# Patient Record
Sex: FEMALE | Race: BLACK OR AFRICAN AMERICAN | Employment: FULL TIME | URBAN - METROPOLITAN AREA
[De-identification: names, ages, dates, MRNs, and addresses within clinical notes are randomized per-mention and may not be internally consistent; named-entity substitution may affect disease eponyms.]

---

## 2017-05-11 ENCOUNTER — HOSPITAL ENCOUNTER (OUTPATIENT)
Dept: LAB | Age: 57
Discharge: HOME | End: 2017-05-11
Payer: COMMERCIAL

## 2017-05-11 LAB
25(OH)D3 SERPL-MCNC: 26.8 NG/ML (ref 30–100)
TSH SERPL DL<=0.05 MIU/L-ACNC: 9.63 UIU/ML (ref 0.36–3.74)

## 2017-05-11 PROCEDURE — 36415 COLL VENOUS BLD VENIPUNCTURE: CPT | Performed by: FAMILY MEDICINE

## 2017-05-11 PROCEDURE — 84439 ASSAY OF FREE THYROXINE: CPT | Performed by: FAMILY MEDICINE

## 2017-05-11 PROCEDURE — 82306 VITAMIN D 25 HYDROXY: CPT | Performed by: FAMILY MEDICINE

## 2017-05-11 PROCEDURE — 84443 ASSAY THYROID STIM HORMONE: CPT | Performed by: FAMILY MEDICINE

## 2017-05-12 LAB
FAX TO INFO,FAXT: NORMAL
FAX TO NUMBER,FAXN: NORMAL
T4 FREE SERPL-MCNC: 1.6 NG/DL (ref 0.82–1.77)

## 2017-06-01 ENCOUNTER — HOSPITAL ENCOUNTER (OUTPATIENT)
Dept: NON INVASIVE DIAGNOSTICS | Age: 57
Discharge: HOME | End: 2017-06-01
Attending: FAMILY MEDICINE
Payer: COMMERCIAL

## 2017-06-01 DIAGNOSIS — R01.1 HEART MURMUR: ICD-10-CM

## 2017-06-01 PROCEDURE — 93306 TTE W/DOPPLER COMPLETE: CPT

## 2017-06-26 ENCOUNTER — HOSPITAL ENCOUNTER (OUTPATIENT)
Dept: LAB | Age: 57
Discharge: HOME | End: 2017-06-26
Payer: COMMERCIAL

## 2017-06-26 LAB
25(OH)D3 SERPL-MCNC: 59.4 NG/ML (ref 30–100)
ALBUMIN SERPL BCP-MCNC: 3.8 G/DL (ref 3.4–5)
ALBUMIN/GLOB SERPL: 1.2 {RATIO} (ref 0.8–1.7)
ALP SERPL-CCNC: 45 U/L (ref 45–117)
ALT SERPL-CCNC: 28 U/L (ref 13–56)
ANION GAP BLD CALC-SCNC: 8 MMOL/L (ref 3–18)
AST SERPL W P-5'-P-CCNC: 30 U/L (ref 15–37)
BASOPHILS # BLD AUTO: 0.1 K/UL (ref 0–0.06)
BASOPHILS # BLD: 1 % (ref 0–2)
BILIRUB SERPL-MCNC: 0.4 MG/DL (ref 0.2–1)
BUN SERPL-MCNC: 13 MG/DL (ref 7–18)
BUN/CREAT SERPL: 14 (ref 12–20)
CALCIUM SERPL-MCNC: 8.9 MG/DL (ref 8.5–10.1)
CHLORIDE SERPL-SCNC: 105 MMOL/L (ref 100–108)
CHOLEST SERPL-MCNC: 314 MG/DL
CO2 SERPL-SCNC: 31 MMOL/L (ref 21–32)
CREAT SERPL-MCNC: 0.94 MG/DL (ref 0.6–1.3)
DIFFERENTIAL METHOD BLD: ABNORMAL
EOSINOPHIL # BLD: 0.2 K/UL (ref 0–0.4)
EOSINOPHIL NFR BLD: 3 % (ref 0–5)
ERYTHROCYTE [DISTWIDTH] IN BLOOD BY AUTOMATED COUNT: 11.9 % (ref 11.6–14.5)
EST. AVERAGE GLUCOSE BLD GHB EST-MCNC: NORMAL MG/DL
FAX TO INFO,FAXT: NORMAL
FAX TO NUMBER,FAXN: NORMAL
GLOBULIN SER CALC-MCNC: 3.3 G/DL (ref 2–4)
GLUCOSE SERPL-MCNC: 93 MG/DL (ref 74–99)
HBA1C MFR BLD: 4.9 % (ref 4.5–5.6)
HCT VFR BLD AUTO: 38.1 % (ref 35–45)
HDLC SERPL-MCNC: 60 MG/DL (ref 40–60)
HDLC SERPL: 5.2 {RATIO} (ref 0–5)
HGB BLD-MCNC: 12.3 G/DL (ref 12–16)
LDLC SERPL CALC-MCNC: 203.8 MG/DL (ref 0–100)
LIPID PROFILE,FLP: ABNORMAL
LYMPHOCYTES # BLD AUTO: 46 % (ref 21–52)
LYMPHOCYTES # BLD: 2.7 K/UL (ref 0.9–3.6)
MCH RBC QN AUTO: 29 PG (ref 24–34)
MCHC RBC AUTO-ENTMCNC: 32.3 G/DL (ref 31–37)
MCV RBC AUTO: 89.9 FL (ref 74–97)
MONOCYTES # BLD: 0.4 K/UL (ref 0.05–1.2)
MONOCYTES NFR BLD AUTO: 6 % (ref 3–10)
NEUTS SEG # BLD: 2.6 K/UL (ref 1.8–8)
NEUTS SEG NFR BLD AUTO: 44 % (ref 40–73)
PLATELET # BLD AUTO: 234 K/UL (ref 135–420)
PMV BLD AUTO: 10.4 FL (ref 9.2–11.8)
POTASSIUM SERPL-SCNC: 4.4 MMOL/L (ref 3.5–5.5)
PROT SERPL-MCNC: 7.1 G/DL (ref 6.4–8.2)
RBC # BLD AUTO: 4.24 M/UL (ref 4.2–5.3)
SODIUM SERPL-SCNC: 144 MMOL/L (ref 136–145)
TRIGL SERPL-MCNC: 251 MG/DL (ref ?–150)
TSH SERPL DL<=0.05 MIU/L-ACNC: 17.9 UIU/ML (ref 0.36–3.74)
VLDLC SERPL CALC-MCNC: 50.2 MG/DL
WBC # BLD AUTO: 5.9 K/UL (ref 4.6–13.2)

## 2017-06-26 PROCEDURE — 80061 LIPID PANEL: CPT | Performed by: FAMILY MEDICINE

## 2017-06-26 PROCEDURE — 36415 COLL VENOUS BLD VENIPUNCTURE: CPT | Performed by: FAMILY MEDICINE

## 2017-06-26 PROCEDURE — 80053 COMPREHEN METABOLIC PANEL: CPT | Performed by: FAMILY MEDICINE

## 2017-06-26 PROCEDURE — 84443 ASSAY THYROID STIM HORMONE: CPT | Performed by: FAMILY MEDICINE

## 2017-06-26 PROCEDURE — 83036 HEMOGLOBIN GLYCOSYLATED A1C: CPT | Performed by: FAMILY MEDICINE

## 2017-06-26 PROCEDURE — 82306 VITAMIN D 25 HYDROXY: CPT | Performed by: FAMILY MEDICINE

## 2017-06-26 PROCEDURE — 85025 COMPLETE CBC W/AUTO DIFF WBC: CPT | Performed by: FAMILY MEDICINE

## 2017-09-06 ENCOUNTER — OFFICE VISIT (OUTPATIENT)
Dept: CARDIOLOGY CLINIC | Age: 57
End: 2017-09-06

## 2017-09-06 VITALS
HEART RATE: 78 BPM | BODY MASS INDEX: 25.43 KG/M2 | OXYGEN SATURATION: 97 % | SYSTOLIC BLOOD PRESSURE: 110 MMHG | WEIGHT: 162 LBS | HEIGHT: 67 IN | DIASTOLIC BLOOD PRESSURE: 60 MMHG

## 2017-09-06 DIAGNOSIS — I49.3 PVC (PREMATURE VENTRICULAR CONTRACTION): ICD-10-CM

## 2017-09-06 DIAGNOSIS — E78.00 HYPERCHOLESTEREMIA: ICD-10-CM

## 2017-09-06 DIAGNOSIS — E03.9 HYPOTHYROIDISM, UNSPECIFIED TYPE: ICD-10-CM

## 2017-09-06 DIAGNOSIS — I10 ESSENTIAL HYPERTENSION: ICD-10-CM

## 2017-09-06 DIAGNOSIS — R00.2 PALPITATIONS: Primary | ICD-10-CM

## 2017-09-06 RX ORDER — LEVOTHYROXINE SODIUM 200 UG/1
TABLET ORAL
Refills: 1 | COMMUNITY
Start: 2017-06-28

## 2017-09-06 RX ORDER — LISINOPRIL AND HYDROCHLOROTHIAZIDE 20; 25 MG/1; MG/1
TABLET ORAL DAILY
COMMUNITY

## 2017-09-06 NOTE — PROGRESS NOTES
1. Have you been to the ER, urgent care clinic since your last visit? Hospitalized since your last visit? no  2. Have you seen or consulted any other health care providers outside of the [address] [place] [address] since your last visit? Include any pap smears or colon screening.  no

## 2017-09-06 NOTE — PROGRESS NOTES
HISTORY OF PRESENT ILLNESS  Carrillo Garcia is a 61 y.o. female. HPI    Patient presents for annual office visit. She has a past medical history significant for hypertension, dyslipidemia, and hypothyroidism. She was referred here for evaluation of palpitations which have been continuous over the past week or 2. The patient states it feels like her heart is skipping beats or beating irregularly. She denies that her heart is racing. No associated dizziness, diaphoresis, syncope or near syncope. She reports that her thyroid medication was recently adjusted 3-4 weeks ago. She feels the palpitations have been worse since that time. She did undergo an echocardiogram in June 2017 which showed preserved LV systolic function, EF 20-35%, mild left atrial enlargement, grade 1 diastolic dysfunction mild to moderate tricuspid regurgitation. She denies any chest pain or pressure. No significant change in her activity tolerance. No shortness of breath at rest with exertion, orthopnea, PND, leg swelling or claudication. Past Medical History:   Diagnosis Date    Ectopic pregnancy     Hypercholesteremia     Hypertension     Hypothyroid     Menopause     2008 Age 49     Current Outpatient Prescriptions   Medication Sig Dispense Refill    levothyroxine (SYNTHROID) 200 mcg tablet TAKE 1 TABLET BY ORAL ROUTE EVERY DAY  1    lisinopril-hydroCHLOROthiazide (PRINZIDE, ZESTORETIC) 20-25 mg per tablet Take  by mouth daily.  colesevelam (WELCHOL) 625 mg tablet Take 1,875 mg by mouth two (2) times daily (with meals).  omeprazole (PRILOSEC) 40 mg capsule Take 40 mg by mouth daily.  Cholecalciferol, Vitamin D3, 3,000 unit tab Take  by mouth.  Omega-3 Fatty Acids 300 mg cap Take  by mouth.  KRILL-OM3-DHA-EPA-OM6-LIP-ASTX PO Take  by mouth.        No Known Allergies     Social History   Substance Use Topics    Smoking status: Former Smoker     Packs/day: 1.00     Years: 15.00     Quit date: 9/6/2005    Smokeless tobacco: Never Used    Alcohol use Yes      Comment: occassionally     Family History   Problem Relation Age of Onset    Diabetes Mother     Diabetes Father          Review of Systems   Constitutional: Negative for chills, fever and weight loss. HENT: Negative for nosebleeds. Eyes: Negative for blurred vision and double vision. Respiratory: Negative for cough, shortness of breath and wheezing. Cardiovascular: Positive for palpitations. Negative for chest pain, orthopnea, claudication, leg swelling and PND. Gastrointestinal: Negative for abdominal pain, heartburn, nausea and vomiting. Genitourinary: Negative for dysuria and hematuria. Musculoskeletal: Negative for falls and myalgias. Skin: Negative for rash. Neurological: Negative for dizziness, focal weakness and headaches. Endo/Heme/Allergies: Does not bruise/bleed easily. Psychiatric/Behavioral: Negative for substance abuse. Visit Vitals    /60    Pulse 78    Ht 5' 7\" (1.702 m)    Wt 73.5 kg (162 lb)    SpO2 97%    BMI 25.37 kg/m2       Physical Exam   Constitutional: She is oriented to person, place, and time. She appears well-developed and well-nourished. HENT:   Head: Normocephalic and atraumatic. Eyes: Conjunctivae are normal.   Neck: Neck supple. No JVD present. Carotid bruit is not present. Cardiovascular: Normal rate, regular rhythm, S1 normal, S2 normal and normal pulses. Exam reveals no gallop. No murmur heard. Pulmonary/Chest: Effort normal and breath sounds normal. She has no wheezes. She has no rales. Abdominal: Soft. Bowel sounds are normal. There is no tenderness. Musculoskeletal: She exhibits no edema, tenderness or deformity. Neurological: She is alert and oriented to person, place, and time. Skin: Skin is warm and dry. Psychiatric: She has a normal mood and affect.  Her behavior is normal. Thought content normal.     EKG: Normal sinus rhythm, normal axis, normal QTc interval, no ST or T-wave abnormalities concerning for ischemia, occasional isolated PVC, otherwise normal tracing. ASSESSMENT and PLAN  Encounter Diagnoses   Name Primary?  Palpitations Yes    PVC (premature ventricular contraction)     Essential hypertension     Hypercholesteremia     Hypothyroidism, unspecified type      Palpitations. Patient may be experiencing symptomatic PVCs. She does have one PVC present on her 12-lead EKG today in the office. I recommended a 48 hour Holter monitor since her symptoms are occurring on a daily basis. She had a recent echocardiogram done in June 2017 which was unremarkable. Essential hypertension. Patient blood pressure appears well-controlled on her current regimen of lisinopril/HCTZ. Dyslipidemia. Patient has been treated with both fish oil and WelChol. This is followed by her PCP. Hypothyroidism. Patient reports a recent Synthroid adjustment which may be contributing to her symptoms. Follow-up in 6 months, sooner if needed.

## 2017-09-06 NOTE — MR AVS SNAPSHOT
Visit Information Date & Time Provider Department Dept. Phone Encounter #  
 9/6/2017  2:00 PM Nino Zavaleta MD [facility] [address] [id number] Upcoming Health Maintenance Date Due Hepatitis C Screening 1960 DTaP/Tdap/Td series (1 - Tdap) 7/8/1980 PAP AKA CERVICAL CYTOLOGY 7/8/1980 FOBT Q 1 YEAR AGE 50-75 7/8/2009 INFLUENZA AGE 9 TO ADULT 8/1/2017 BREAST CANCER SCRN MAMMOGRAM 11/7/2018 Allergies as of 9/6/2017  Review Complete On: 9/6/2017 By: Kenneth Solano No Known Allergies Current Immunizations  Never Reviewed No immunizations on file. Not reviewed this visit You Were Diagnosed With   
  
 Codes Comments Palpitations    -  Primary ICD-10-CM: R00.2 ICD-9-CM: 785.1 Vitals BP Pulse Height(growth percentile) Weight(growth percentile) SpO2 BMI  
 110/60 78 5' 7\" (1.702 m) 162 lb (73.5 kg) 97% 25.37 kg/m2 OB Status Smoking Status Menopause Former Smoker Vitals History BMI and BSA Data Body Mass Index Body Surface Area  
 25.37 kg/m 2 1.86 m 2 Your Updated Medication List  
  
   
This list is accurate as of: 9/6/17  2:19 PM.  Always use your most recent med list.  
  
  
  
  
 Cholecalciferol (Vitamin D3) 3,000 unit Tab Take  by mouth. KRILL-OM3-DHA-EPA-OM6-LIP-ASTX PO Take  by mouth.  
  
 levothyroxine 200 mcg tablet Commonly known as:  SYNTHROID  
TAKE 1 TABLET BY ORAL ROUTE EVERY DAY  
  
 lisinopril-hydroCHLOROthiazide 20-25 mg per tablet Commonly known as:  Pamula Colla Take  by mouth daily. Omega-3 Fatty Acids 300 mg Cap Take  by mouth. omeprazole 40 mg capsule Commonly known as:  PRILOSEC Take 40 mg by mouth daily. WELCHOL 625 mg tablet Generic drug:  colesevelam  
Take 1,875 mg by mouth two (2) times daily (with meals). We Performed the Following AMB POC EKG ROUTINE W/ 12 LEADS, INTER & REP [74278 CPT(R)] To-Do List   
 09/06/2017 ECG:  ECG HOLTER MONITOR, UP TO 48 HRS Introducing [facility]! Dear Natalie Izquierdo: Thank you for requesting a [place] [facility] account. Our records indicate that you already have an active [place] [facility] account. You can access your account anytime at [email or web address] Did you know that you can access your hospital and ER discharge instructions at any time in [place] [facility]? You can also review all of your test results from your hospital stay or ER visit. Additional Information If you have questions, please visit the Frequently Asked Questions section of the [place] [facility] website at [email or web address] Remember, [place] [facility] is NOT to be used for urgent needs. For medical emergencies, dial 911. Now available from your iPhone and Android! Please provide this summary of care documentation to your next provider. Your primary care clinician is listed as Eun Almeida. If you have any questions after today's visit, please call [phone].

## 2017-11-10 ENCOUNTER — HOSPITAL ENCOUNTER (OUTPATIENT)
Dept: MAMMOGRAPHY | Age: 57
Discharge: HOME | End: 2017-11-10
Attending: FAMILY MEDICINE
Payer: COMMERCIAL

## 2017-11-10 DIAGNOSIS — Z12.31 VISIT FOR SCREENING MAMMOGRAM: ICD-10-CM

## 2017-11-10 PROCEDURE — 77063 BREAST TOMOSYNTHESIS BI: CPT

## 2018-01-30 ENCOUNTER — OFFICE VISIT (OUTPATIENT)
Dept: VASCULAR SURGERY | Age: 58
End: 2018-01-30

## 2018-01-30 VITALS
SYSTOLIC BLOOD PRESSURE: 188 MMHG | HEART RATE: 76 BPM | HEIGHT: 66 IN | DIASTOLIC BLOOD PRESSURE: 72 MMHG | BODY MASS INDEX: 25.39 KG/M2 | RESPIRATION RATE: 18 BRPM | WEIGHT: 158 LBS

## 2018-01-30 DIAGNOSIS — M79.605 LEG PAIN, BILATERAL: Primary | ICD-10-CM

## 2018-01-30 DIAGNOSIS — M79.604 LEG PAIN, BILATERAL: Primary | ICD-10-CM

## 2018-01-30 NOTE — MR AVS SNAPSHOT
[facility] 
 
 
 [address] [place] Carrasqu[place] 
[phone] Patient: Evelina Ann MRN: [id number] DHM:[birth year] Visit Information Date & Time Provider Department Dept. Phone Encounter #  
 1/30/2018  9:00 AM Maine Velasquez MD [facility] [address] [id number] Your Appointments 2/6/2018  9:00 AM  
Follow Up with Maine Velasquez MD  
[facility] ([facility]) Appt Note: 1 week FU with studies [address]  
  
   
 [address] [facility]  
  
    
 2/9/2018  1:20 PM  
Follow Up with Nargis Carmona MD  
[facility] ([address] [place] [address]) Appt Note: 5 mth f/up [place] [facility] [id number] [phone] [address] Upcoming Health Maintenance Date Due Hepatitis C Screening 1960 DTaP/Tdap/Td series (1 - Tdap) 7/8/1980 FOBT Q 1 YEAR AGE 50-75 7/8/2009 PAP AKA CERVICAL CYTOLOGY 7/30/2016 Influenza Age 5 to Adult 8/1/2017 BREAST CANCER SCRN MAMMOGRAM 11/10/2019 Allergies as of 1/30/2018  Review Complete On: 1/30/2018 By: Jonnie Mccormick RN No Known Allergies Current Immunizations  Never Reviewed No immunizations on file. Not reviewed this visit You Were Diagnosed With   
  
 Codes Comments Leg pain, bilateral    -  Primary ICD-10-CM: M79.604, M79.605 ICD-9-CM: 729.5 Vitals BP Pulse Resp Height(growth percentile) Weight(growth percentile) BMI  
 188/72 (BP 1 Location: Left arm, BP Patient Position: Sitting) 76 18 5' 6\" (1.676 m) 158 lb (71.7 kg) 25.5 kg/m2 OB Status Smoking Status Menopause Former Smoker BMI and BSA Data Body Mass Index Body Surface Area  25.5 kg/m 2 1.83 m 2  
  
  
 Your Updated Medication List  
  
   
This list is accurate as of: 1/30/18  9:23 AM.  Always use your most recent med list.  
  
  
  
  
 Cholecalciferol (Vitamin D3) 3,000 unit Tab Take  by mouth. KRILL-OM3-DHA-EPA-OM6-LIP-ASTX PO Take  by mouth.  
  
 levothyroxine 200 mcg tablet Commonly known as:  SYNTHROID  
TAKE 1 TABLET BY ORAL ROUTE EVERY DAY  
  
 lisinopril-hydroCHLOROthiazide 20-25 mg per tablet Commonly known as:  Valeria Heather Take  by mouth daily. Omega-3 Fatty Acids 300 mg Cap Take  by mouth. omeprazole 40 mg capsule Commonly known as:  PRILOSEC Take 40 mg by mouth daily. WELCHOL 625 mg tablet Generic drug:  colesevelam  
Take 1,875 mg by mouth two (2) times daily (with meals). To-Do List   
 02/01/2018 Imaging:  DUPLEX LOWER EXT VENOUS BILAT   
  
 02/02/2018 1:00 PM  
  Appointment with [facility] at [facility] [address] [place] [address] ([phone]) Please have patient bring a copy of their order if same day add-on. It can also be faxed to Shelley [facility]bskc[facility] - [phone]. There are no restrictions for this study. The patient can eat breakfast and take their medications. Introducing \[facility]\"" & HEALTH SERVICES! Dear Tiffany Carver: Thank you for requesting a [employer] account. Our records indicate that you already have an active [employer] account. You can access your account anytime at [email or web address] Did you know that you can access your hospital and ER discharge instructions at any time in [employer]!? You can also review all of your test results from your hospital stay or ER visit. Additional Information If you have questions, please visit the Frequently Asked Questions section of the [employer]! website at [email or web address] Remember, [employer]! is NOT to be used for urgent needs. For medical emergencies, dial 911. Now available from your iPhone and Android! Please provide this summary of care documentation to your next provider. Your primary care clinician is listed as Eun Almeida. If you have any questions after today's visit, please call [phone].

## 2018-01-30 NOTE — PROGRESS NOTES
Shazia Gasca    Chief Complaint   Patient presents with    Leg Pain       History and Physical    Ms. Filemon Otoole is a 61year old female presenting to our office for evaluation of her bilateral leg pain. Ms. Filemon Otoole states that for the past two weeks she has been having aching and pain in her lower legs from her knees down to her ankles on both sides. She has never had pain like this before and states it is a constant ache. She did have some swelling initially so she has been wearing her compression stockings regularly and has not noticed any change in her symptoms. Past Medical History:   Diagnosis Date    Ectopic pregnancy     Hypercholesteremia     Hypertension     Hypothyroid     Menopause     2008 Age 49     Patient Active Problem List   Diagnosis Code    Palpitations R00.2    Hypothyroid E03.9    Hypertension I10    Hypercholesteremia E78.00    Essential hypertension I10    Hypothyroidism E03.9    Leg pain, bilateral M79.604, M79.605     Past Surgical History:   Procedure Laterality Date    HX ORTHOPAEDIC      c section     Current Outpatient Prescriptions   Medication Sig Dispense Refill    levothyroxine (SYNTHROID) 200 mcg tablet TAKE 1 TABLET BY ORAL ROUTE EVERY DAY  1    lisinopril-hydroCHLOROthiazide (PRINZIDE, ZESTORETIC) 20-25 mg per tablet Take  by mouth daily.  colesevelam (WELCHOL) 625 mg tablet Take 1,875 mg by mouth two (2) times daily (with meals).  omeprazole (PRILOSEC) 40 mg capsule Take 40 mg by mouth daily.  Cholecalciferol, Vitamin D3, 3,000 unit tab Take  by mouth.  Omega-3 Fatty Acids 300 mg cap Take  by mouth.  KRILL-OM3-DHA-EPA-OM6-LIP-ASTX PO Take  by mouth. No Known Allergies  Social History     Social History    Marital status: SINGLE     Spouse name: N/A    Number of children: N/A    Years of education: N/A     Occupational History    Not on file.      Social History Main Topics    Smoking status: Former Smoker     Packs/day: 1.00     Years: 15.00     Quit date: 9/6/2005    Smokeless tobacco: Never Used    Alcohol use Yes      Comment: occassionally    Drug use: No    Sexual activity: No     Other Topics Concern    Not on file     Social History Narrative      Family History   Problem Relation Age of Onset    Diabetes Mother     Diabetes Father     Hypertension Sister     Cancer Brother        Review of Systems    Review of Systems   Constitutional: Negative for chills, diaphoresis, fever, malaise/fatigue and weight loss. HENT: Negative for hearing loss and sore throat. Eyes: Negative for blurred vision, photophobia and redness. Respiratory: Negative for cough, hemoptysis, shortness of breath and wheezing. Cardiovascular: Positive for leg swelling. Negative for chest pain, palpitations and orthopnea. Gastrointestinal: Negative for abdominal pain, blood in stool, constipation, diarrhea, heartburn, nausea and vomiting. Genitourinary: Negative for dysuria, frequency, hematuria and urgency. Musculoskeletal: Positive for back pain. Negative for myalgias. Skin: Negative for itching and rash. Neurological: Negative for dizziness, speech change, focal weakness, weakness and headaches. Endo/Heme/Allergies: Does not bruise/bleed easily. Psychiatric/Behavioral: Negative for depression and suicidal ideas.             Physical Exam:    Visit Vitals    /72 (BP 1 Location: Left arm, BP Patient Position: Sitting)    Pulse 76    Resp 18    Ht 5' 6\" (1.676 m)    Wt 158 lb (71.7 kg)    BMI 25.5 kg/m2      Physical Examination: General appearance - alert, well appearing, and in no distress  Mental status - alert, oriented to person, place, and time  Eyes - sclera anicteric, left eye normal, right eye normal  Ears - right ear normal, left ear normal  Nose - normal and patent, no erythema, discharge or polyps  Mouth - mucous membranes moist, pharynx normal without lesions  Neck - supple, no significant adenopathy  Lymphatics - no palpable lymphadenopathy  Chest - clear to auscultation, no wheezes, rales or rhonchi, symmetric air entry  Heart - normal rate and regular rhythm  Abdomen - soft, nontender, nondistended, no masses or organomegaly  Extremities - peripheral pulses normal, no pedal edema, no clubbing or cyanosis      Impression and Plan:    ICD-10-CM ICD-9-CM    1. Leg pain, bilateral M79.604 729.5 DUPLEX LOWER EXT VENOUS BILAT    M79.605       Orders Placed This Encounter    DUPLEX LOWER EXT VENOUS BILAT     I told Ms. Mari Ahmadi that I do not believe her problems are vascular in nature. Typically, arterial and venous problems do not cause constant aching. She has palpable pedal pulses so I believe an arterial cause is highly unlikely. Given her swelling though, I will evaluate her for venous reflux to see if that could possibly be a cause of her symptoms. At the same setting, I will ask the tech to check for a Baker's cyst to see if she may be having some compressive symptoms as well. I have instructed her to continue with her compression stockings and to try warm compresses behind her knee since this seems to be the area where the discomfort originates from. Follow-up Disposition:  Return in about 2 weeks (around 2/13/2018) for Vascular labs, Symptom check. The treatment plan was reviewed with the patient in detail. The patient voiced understanding of this plan and all questions and concerns were addressed. The patient agrees with this plan. We discussed the signs and symptoms that would require earlier attention or intervention. The patient was given educational material related to his/her visit and the patient has voiced understanding of the material.     I appreciate the opportunity to participate in the care of your patient. I will be sure to keep you informed of any subsequent changes in the treatment plan.   If you have any questions or concerns, please feel free to contact me.  Bernice Machado MD    PLEASE NOTE:  This document has been produced using voice recognition software. Unrecognized errors in transcription may be present.

## 2018-02-02 ENCOUNTER — HOSPITAL ENCOUNTER (OUTPATIENT)
Dept: VASCULAR SURGERY | Age: 58
Discharge: HOME | End: 2018-02-02
Attending: SURGERY
Payer: COMMERCIAL

## 2018-02-02 DIAGNOSIS — M79.605 LEG PAIN, BILATERAL: ICD-10-CM

## 2018-02-02 DIAGNOSIS — M79.604 LEG PAIN, BILATERAL: ICD-10-CM

## 2018-02-02 PROCEDURE — 93970 EXTREMITY STUDY: CPT

## 2018-02-02 NOTE — PROCEDURES
[facility]  *** FINAL REPORT ***    Name: Monica Blue  MRN: [id number]    Outpatient  : 1960  HIS Order #: [id number]  [address] [facility] Visit #: [id number]  Date: 2018    TYPE OF TEST: Peripheral Venous Testing    REASON FOR TEST  Limb swelling    Right Leg:-  Deep venous thrombosis:           No  Superficial venous thrombosis:    No  Deep venous insufficiency:        Yes  Superficial venous insufficiency: No    Left Leg:-  Deep venous thrombosis:           No  Superficial venous thrombosis:    No  Deep venous insufficiency:        Yes  Superficial venous insufficiency: No    Abnormal Valve Closure Times (seconds):    Right Common Femoral: 1.1    Right Deep Femoral:   4.8    Left Common Femoral:  1.4    Left Deep Femoral:    2.5    Vein Mapping:    Diam.   Depth  (mm)    Right Great Saphenous Vein:    High Thigh:      3.8    Mid Thigh:       3.0    Low Thigh:       2.6    Knee:            3.5    High Calf:       2.1    Low Calf:        2.6    Ankle:           1.6    Right Small Saphenous Vein:    SPJ:    Mid Calf: Ankle:    Giacomini:  Accessory saph.:  Briscoe :  Enrrique Good :    Left Great Saphenous Vein:    High Thigh:      3.8    Mid Thigh:       2.9    Low Thigh:       2.7    Knee:            3.0    High Calf:       2.6    Low Calf:        1.8    Ankle:           1.4    Left Small Saphenous Vein:    SPJ:    Mid Calf: Ankle:    Giacomini:  Accessory saph.:  Briscoe :  Mason :      INTERPRETATION/FINDINGS  Duplex images were obtained using 2-D gray scale, color flow, and  spectral Doppler analysis. Right leg :  1. Deep vein(s) visualized include the common femoral, deep femoral,  proximal femoral, mid femoral, distal femoral, popliteal(above knee),  popliteal(fossa), popliteal(below knee), posterior tibial and peroneal   veins. 2. No evidence of deep venous thrombosis detected in the veins  visualized.   3. Superficial vein(s) visualized include the great saphenous and  small saphenous veins. 4. No evidence of superficial thrombosis detected. 5. Incompetent deep venous system with reflux involving the common  femoral and deep femoral veins. 6. No evidence of reflux detected in the superficial veins visualized. 7. Saphenopopliteal junction not seen and small saphenous vein is too  small to visualize. Left leg :  1. Deep vein(s) visualized include the common femoral, deep femoral,  proximal femoral, mid femoral, distal femoral, popliteal(above knee),  popliteal(fossa), popliteal(below knee), posterior tibial and peroneal   veins. 2. No evidence of deep venous thrombosis detected in the veins  visualized. 3. Superficial vein(s) visualized include the great saphenous and  small saphenous veins. 4. No evidence of superficial thrombosis detected. 5. Incompetent deep venous system with reflux involving the common  femoral, deep femoral and proximal femoral veins. 6. No evidence of reflux detected in the superficial veins visualized. 7. Small saphenous vein is too small to visualize. ADDITIONAL COMMENTS    I have personally reviewed the data relevant to the interpretation of  this  study. TECHNOLOGIST: Cameron Alcaraz  Signed: 02/02/2018 03:42 PM    PHYSICIAN: Ciro Marmolejo.  Santosh Ba MD  Signed: 02/05/2018 10:22 AM

## 2018-02-06 ENCOUNTER — OFFICE VISIT (OUTPATIENT)
Dept: VASCULAR SURGERY | Age: 58
End: 2018-02-06

## 2018-02-06 ENCOUNTER — HOSPITAL ENCOUNTER (OUTPATIENT)
Dept: LAB | Age: 58
Discharge: HOME | End: 2018-02-06
Attending: FAMILY MEDICINE
Payer: COMMERCIAL

## 2018-02-06 VITALS
HEART RATE: 70 BPM | SYSTOLIC BLOOD PRESSURE: 132 MMHG | HEIGHT: 66 IN | DIASTOLIC BLOOD PRESSURE: 74 MMHG | WEIGHT: 158 LBS | RESPIRATION RATE: 18 BRPM | BODY MASS INDEX: 25.39 KG/M2

## 2018-02-06 DIAGNOSIS — M79.89 LEG SWELLING: ICD-10-CM

## 2018-02-06 DIAGNOSIS — M79.605 LEG PAIN, BILATERAL: Primary | ICD-10-CM

## 2018-02-06 DIAGNOSIS — M79.604 LEG PAIN, BILATERAL: Primary | ICD-10-CM

## 2018-02-06 LAB — TSH SERPL DL<=0.05 MIU/L-ACNC: 0.01 UIU/ML (ref 0.36–3.74)

## 2018-02-06 PROCEDURE — 86003 ALLG SPEC IGE CRUDE XTRC EA: CPT | Performed by: FAMILY MEDICINE

## 2018-02-06 PROCEDURE — 84443 ASSAY THYROID STIM HORMONE: CPT | Performed by: FAMILY MEDICINE

## 2018-02-06 PROCEDURE — 36415 COLL VENOUS BLD VENIPUNCTURE: CPT | Performed by: FAMILY MEDICINE

## 2018-02-06 NOTE — MR AVS SNAPSHOT
[address] [place] [address] 
[phone] Patient: Conrad Vieira MRN: [id number] QGP:[birth year] Visit Information Date & Time Provider Department Dept. Phone Encounter #  
 2/6/2018  1:00 PM Sacha Lopez NP [facility] [address] [id number] Upcoming Health Maintenance Date Due Hepatitis C Screening 1960 DTaP/Tdap/Td series (1 - Tdap) 7/8/1980 FOBT Q 1 YEAR AGE 50-75 7/8/2009 PAP AKA CERVICAL CYTOLOGY 7/30/2016 Influenza Age 5 to Adult 8/1/2017 BREAST CANCER SCRN MAMMOGRAM 11/10/2019 Allergies as of 2/6/2018  Review Complete On: 2/6/2018 By: Pasquale Renteria RN No Known Allergies Current Immunizations  Never Reviewed No immunizations on file. Not reviewed this visit Vitals BP Pulse Resp Height(growth percentile) Weight(growth percentile) BMI  
 132/74 (BP 1 Location: Left arm, BP Patient Position: Sitting) 70 18 5' 6\" (1.676 m) 158 lb (71.7 kg) 25.5 kg/m2 OB Status Smoking Status Menopause Former Smoker BMI and BSA Data Body Mass Index Body Surface Area 25.5 kg/m 2 1.83 m 2 Your Updated Medication List  
  
   
This list is accurate as of: 2/6/18  2:10 PM.  Always use your most recent med list.  
  
  
  
  
 Cholecalciferol (Vitamin D3) 3,000 unit Tab Take  by mouth. KRILL-OM3-DHA-EPA-OM6-LIP-ASTX PO Take  by mouth.  
  
 levothyroxine 200 mcg tablet Commonly known as:  SYNTHROID  
TAKE 1 TABLET BY ORAL ROUTE EVERY DAY  
  
 lisinopril-hydroCHLOROthiazide 20-25 mg per tablet Commonly known as:  Lenard Blake Take  by mouth daily. Omega-3 Fatty Acids 300 mg Cap Take  by mouth. omeprazole 40 mg capsule Commonly known as:  PRILOSEC Take 40 mg by mouth daily. WELCHOL 625 mg tablet Generic drug:  colesevelam  
 Take 1,875 mg by mouth two (2) times daily (with meals). Introducing [facility] [place] [facility]! Dear Beth Curran: Thank you for requesting a [employer] account. Our records indicate that you already have an active [employer] account. You can access your account anytime at [email or web address] Did you know that you can access your hospital and ER discharge instructions at any time in [employer]? You can also review all of your test results from your hospital stay or ER visit. Additional Information If you have questions, please visit the Frequently Asked Questions section of the [employer] website at [email or web address] Remember, [employer] is NOT to be used for urgent needs. For medical emergencies, dial 911. Now available from your iPhone and Android! Please provide this summary of care documentation to your next provider. Your primary care clinician is listed as Eun Almeida. If you have any questions after today's visit, please call [phone].

## 2018-02-07 NOTE — PROGRESS NOTES
Diogo Sender    Chief Complaint   Patient presents with    Leg Pain    Swelling       History and Physical    Mrs. Lexine Blizzard is a 61year old female who returns to our office for continued evaluation of her bilateral leg pain and swelling. She states she is on her feet all day for work and is still having pain in the backs of her legs below her knees, she has been wearing compression but has not noticed any difference in her symptoms. Mrs. Lexine Blizzard has no new complaints at this time. Past Medical History:   Diagnosis Date    Ectopic pregnancy     Hypercholesteremia     Hypertension     Hypothyroid     Menopause     2008 Age 49     Patient Active Problem List   Diagnosis Code    Palpitations R00.2    Hypothyroid E03.9    Hypertension I10    Hypercholesteremia E78.00    Essential hypertension I10    Hypothyroidism E03.9    Leg pain, bilateral M79.604, M79.605     Past Surgical History:   Procedure Laterality Date    HX ORTHOPAEDIC      c section     Current Outpatient Prescriptions   Medication Sig Dispense Refill    levothyroxine (SYNTHROID) 200 mcg tablet TAKE 1 TABLET BY ORAL ROUTE EVERY DAY  1    lisinopril-hydroCHLOROthiazide (PRINZIDE, ZESTORETIC) 20-25 mg per tablet Take  by mouth daily.  colesevelam (WELCHOL) 625 mg tablet Take 1,875 mg by mouth two (2) times daily (with meals).  omeprazole (PRILOSEC) 40 mg capsule Take 40 mg by mouth daily.  Cholecalciferol, Vitamin D3, 3,000 unit tab Take  by mouth.  Omega-3 Fatty Acids 300 mg cap Take  by mouth.  KRILL-OM3-DHA-EPA-OM6-LIP-ASTX PO Take  by mouth. No Known Allergies  Social History     Social History    Marital status: SINGLE     Spouse name: N/A    Number of children: N/A    Years of education: N/A     Occupational History    Not on file.      Social History Main Topics    Smoking status: Former Smoker     Packs/day: 1.00     Years: 15.00     Quit date: 9/6/2005    Smokeless tobacco: Never Used   Chong Kirkpatrick Alcohol use Yes      Comment: occassionally    Drug use: No    Sexual activity: No     Other Topics Concern    Not on file     Social History Narrative      Family History   Problem Relation Age of Onset    Diabetes Mother     Diabetes Father     Hypertension Sister     Cancer Brother        Review of Systems    Review of Systems   Constitutional: Negative for chills, fever, malaise/fatigue and weight loss. HENT: Negative for ear pain and hearing loss. Eyes: Negative for blurred vision, pain and discharge. Respiratory: Negative for cough, hemoptysis, sputum production and shortness of breath. Cardiovascular: Positive for leg swelling. Negative for chest pain, palpitations, orthopnea and claudication. Gastrointestinal: Negative for heartburn, nausea and vomiting. Genitourinary: Negative for dysuria and urgency. Musculoskeletal: Positive for myalgias. +bilateral leg pain   Skin: Negative for itching and rash. Neurological: Negative for dizziness, tingling, weakness and headaches. Endo/Heme/Allergies: Does not bruise/bleed easily. Psychiatric/Behavioral: Negative for depression.      Physical Exam:    Visit Vitals    /74 (BP 1 Location: Left arm, BP Patient Position: Sitting)    Pulse 70    Resp 18    Ht 5' 6\" (1.676 m)    Wt 158 lb (71.7 kg)    BMI 25.5 kg/m2      Physical Examination: General appearance - alert, well appearing, and in no distress  Mental status - alert, oriented to person, place, and time, normal mood, behavior, speech, dress, motor activity, and thought processes  Eyes - left eye normal, right eye normal  Ears - right ear normal, left ear normal  Mouth - mucous membranes moist  Chest - clear to auscultation, no wheezes  Heart - normal rate and regular rhythm  Musculoskeletal - no joint tenderness, deformity or swelling  Extremities - peripheral pulses normal, no pedal edema, no clubbing or cyanosis  Skin - normal coloration and turgor, no rashes, no suspicious skin lesions noted      Impression and Plan:    ICD-10-CM ICD-9-CM    1. Leg pain, bilateral M79.604 729.5     M79.605     2. Leg swelling M79.89 729.81      No orders of the defined types were placed in this encounter. Mrs. Maura Huerta and I discussed her reflux study results showing reflux in the deep venous system but not in the superficial system. Unfortunately, there is no procedure to correct reflux in the deep venous system. This reflux may be contributing to her symptoms, however, I do not think it is the sole cause. I suggested Mrs. Maura Huerta continue to wear compression, especially when she is on her feet for extended periods of time. I also suggested physical therapy. Mrs. Maura Huerta states she has seen PT before with little improvement and she is not interested in trying it again at this time. She also states she has an orthopedic doctor she would like to see so she will contact them for an appointment. I think this is reasonable. We will see Mrs. Maura Huerta again as needed. Follow-up Disposition:  Return if symptoms worsen or fail to improve. The treatment plan was reviewed with the patient in detail. The patient voiced understanding of this plan and all questions and concerns were addressed. The patient agrees with this plan. We discussed the signs and symptoms that would require earlier attention or intervention. The patient was given educational material related to his/her visit and the patient has voiced understanding of the material.     I appreciate the opportunity to participate in the care of your patient. I will be sure to keep you informed of any subsequent changes in the treatment plan. If you have any questions or concerns, please feel free to contact me. Morgan Casarez NP    PLEASE NOTE:  This document has been produced using voice recognition software. Unrecognized errors in transcription may be present.

## 2018-02-09 LAB
CLAM IGE QN: <0.1 KU/L
CLASS DESCRIPTION, 600268: NORMAL
CODFISH IGE QN: <0.1 KU/L
CORN IGE QN: <0.1 KU/L
COW MILK IGE QN: <0.1 KU/L
EGG WHITE IGE QN: <0.1 KU/L
PEANUT IGE QN: <0.1 KU/L
SCALLOP IGE QN: <0.1 KU/L
SESAME SEED IGE QN: <0.1 KU/L
SHRIMP IGE QN: <0.1 KU/L
SOYBEAN IGE QN: <0.1 KU/L
WALNUT IGE QN: <0.1 KU/L
WHEAT IGE QN: <0.1 KU/L

## 2018-02-10 LAB
FAX TO INFO,FAXT: NORMAL
FAX TO NUMBER,FAXN: NORMAL

## 2018-02-15 LAB
A ALTERNATA IGE QN: <0.1 KU/L
A FUMIGATUS IGE QN: <0.1 KU/L
AMER ROACH IGE QN: <0.1 KU/L
AMER SYCAMORE IGE QN: <0.1 KU/L
BAHIA GRASS IGE QN: <0.1 KU/L
BERMUDA GRASS IGE QN: <0.1 KU/L
BOXELDER IGE QN: <0.1 KU/L
C HERBARUM IGE QN: <0.1 KU/L
CAT DANDER IGG QN: <0.1 KU/L
CLASS DESCRIPTION, 600268: ABNORMAL
COMMON RAGWEED IGE QN: <0.1 KU/L
D FARINAE IGE QN: 0.13 KU/L
D PTERONYSS IGE QN: 0.16 KU/L
DEPRECATED IGE QN: <0.1 KU/L
DOG DANDER IGE QN: <0.1 KU/L
ENGL PLANTAIN IGE QN: <0.1 KU/L
JOHNSON GRASS IGE QN: <0.1 KU/L
M RACEMOSUS IGE QN: <0.1 KU/L
MT JUNIPER IGE QN: <0.1 KU/L
MUGWORT IGE QN: <0.1 KU/L
NETTLE IGE QN: <0.1 KU/L
P NOTATUM IGE QN: <0.1 KU/L
S BOTRYOSUM IGE QN: <0.1 KU/L
SHEEP SORREL IGE QN: <0.1 KU/L
SWEET GUM IGE QN: <0.1 KU/L
TIMOTHY IGE QN: 0.13 KU/L
WHITE BIRCH IGE QN: <0.1 KU/L
WHITE ELM IGG QN: <0.1 KU/L
WHITE HICKORY IGE QN: <0.1 KU/L
WHITE MULBERRY IGE QN: <0.1 KU/L
WHITE OAK IGE QN: <0.1 KU/L

## 2018-02-16 LAB
FAX TO INFO,FAXT: NORMAL
FAX TO NUMBER,FAXN: NORMAL

## 2018-04-18 ENCOUNTER — PATIENT OUTREACH (OUTPATIENT)
Dept: OTHER | Age: 58
End: 2018-04-18

## 2018-04-18 NOTE — PROGRESS NOTES
Received an incoming voicemail this morning left by a patient last evening inquiring about ECM services she saw under [facility]. Requested a call back. Returned the call this AM but no response and left a VM with my name and contact information. Waited for return call throughout the day, but no return call from the voicemail I left. Will attempt to FU with another call in one day.

## 2018-04-20 ENCOUNTER — PATIENT OUTREACH (OUTPATIENT)
Dept: OTHER | Age: 58
End: 2018-04-20

## 2018-04-20 NOTE — LETTER
Ms. Collins Me [address] [facility] [address] Dear Ms. Barb Hodges, My name is Matias Mendes RN, Employee Care Manager for [employer], and I have been trying to reach you. You left your name and number on our Employee Care Management voicemail about your medications. The Employee Care  is a free-of-charge, confidential service provided to our employees and their family members covered by the [employer]. Part of my job is to follow up with members who have recently been in the hospital or emergency room, to help them coordinate their care and answer questions they may have about their visit. I am able to provide assistance with medication questions, scheduling needed follow-up appointments, and arranging services like home health or home medical equipment. I can also provide education regarding your hospital or ER visit as well as your medical conditions. As healthcare providers, we know that patients do better when they have close follow up with a primary care provider (PCP), especially after a hospital or emergency department visit. If you do not have a PCP, I can help you find one that is convenient to you and covered by your insurance. I can also help you understand any after visit instructions, such as what symptoms to watch out for, or any new or changed medications. Employee Care Management now partners with [employer]. If you are a qualifying employee, you may receive an additional 10 wellness incentive points for every month of active participation with an Employee Care Manager. Remember that you can access your After Visit Summary by logging into your [employer] account. If you do not have a [employer] account, I can help you request access. Our program is designed to provide you with the opportunity to have a [employer] care manager partner with you for your healthcare needs. Please contact me at the below number if I can provide you with assistance for any of the above services. Sincerely, Alba Thayer RN, BSN  Employee Care Manager [email or web address]

## 2018-04-20 NOTE — PROGRESS NOTES
Second attempt to reach patient for [facility], and discharge assessment. Discreet VM left. Will send UTR letter.

## 2018-05-11 ENCOUNTER — HOSPITAL ENCOUNTER (OUTPATIENT)
Dept: LAB | Age: 58
Discharge: HOME | End: 2018-05-11

## 2018-05-11 PROCEDURE — 86706 HEP B SURFACE ANTIBODY: CPT | Performed by: EMERGENCY MEDICINE

## 2018-05-11 PROCEDURE — 86765 RUBEOLA ANTIBODY: CPT | Performed by: EMERGENCY MEDICINE

## 2018-05-12 LAB — MEV IGG SER IA-ACNC: 141 AU/ML

## 2018-05-14 LAB
HBV SURFACE AB SER QL IA: NEGATIVE
HBV SURFACE AB SERPL IA-ACNC: 4.28 MIU/ML
HEP BS AB COMMENT,HBSAC: ABNORMAL

## 2018-05-24 ENCOUNTER — PATIENT OUTREACH (OUTPATIENT)
Dept: OTHER | Age: 58
End: 2018-05-24

## 2021-08-03 PROBLEM — I10 HYPERTENSION: Status: RESOLVED | Noted: 2021-08-03 | Resolved: 2021-08-03

## 2021-08-03 PROBLEM — E03.9 HYPOTHYROID: Status: RESOLVED | Noted: 2021-08-03 | Resolved: 2021-08-03
